# Patient Record
Sex: FEMALE | Race: WHITE | NOT HISPANIC OR LATINO | Employment: OTHER | URBAN - METROPOLITAN AREA
[De-identification: names, ages, dates, MRNs, and addresses within clinical notes are randomized per-mention and may not be internally consistent; named-entity substitution may affect disease eponyms.]

---

## 2017-01-30 ENCOUNTER — ALLSCRIPTS OFFICE VISIT (OUTPATIENT)
Dept: OTHER | Facility: OTHER | Age: 47
End: 2017-01-30

## 2017-03-02 ENCOUNTER — ALLSCRIPTS OFFICE VISIT (OUTPATIENT)
Dept: OTHER | Facility: OTHER | Age: 47
End: 2017-03-02

## 2018-01-14 VITALS
HEART RATE: 100 BPM | RESPIRATION RATE: 16 BRPM | DIASTOLIC BLOOD PRESSURE: 72 MMHG | HEIGHT: 68 IN | TEMPERATURE: 99.4 F | SYSTOLIC BLOOD PRESSURE: 124 MMHG | WEIGHT: 159.56 LBS | BODY MASS INDEX: 24.18 KG/M2

## 2018-01-15 VITALS
RESPIRATION RATE: 20 BRPM | BODY MASS INDEX: 23.98 KG/M2 | SYSTOLIC BLOOD PRESSURE: 122 MMHG | HEART RATE: 80 BPM | DIASTOLIC BLOOD PRESSURE: 70 MMHG | HEIGHT: 68 IN | WEIGHT: 158.25 LBS

## 2019-02-18 ENCOUNTER — OFFICE VISIT (OUTPATIENT)
Dept: FAMILY MEDICINE CLINIC | Facility: CLINIC | Age: 49
End: 2019-02-18

## 2019-02-18 VITALS
HEIGHT: 68 IN | RESPIRATION RATE: 12 BRPM | BODY MASS INDEX: 24.4 KG/M2 | WEIGHT: 161 LBS | DIASTOLIC BLOOD PRESSURE: 78 MMHG | HEART RATE: 80 BPM | SYSTOLIC BLOOD PRESSURE: 116 MMHG

## 2019-02-18 DIAGNOSIS — Z23 NEED FOR TUBERCULOSIS VACCINATION: ICD-10-CM

## 2019-02-18 DIAGNOSIS — Z00.00 PE (PHYSICAL EXAM), ANNUAL: Primary | ICD-10-CM

## 2019-02-18 DIAGNOSIS — Z12.11 SCREENING FOR MALIGNANT NEOPLASM OF COLON: ICD-10-CM

## 2019-02-18 DIAGNOSIS — Z23 NEED FOR TETANUS, DIPHTHERIA, AND ACELLULAR PERTUSSIS (TDAP) VACCINE: ICD-10-CM

## 2019-02-18 DIAGNOSIS — Z12.39 SCREENING FOR MALIGNANT NEOPLASM OF BREAST: ICD-10-CM

## 2019-02-18 PROCEDURE — 99396 PREV VISIT EST AGE 40-64: CPT | Performed by: NURSE PRACTITIONER

## 2019-02-18 PROCEDURE — 86580 TB INTRADERMAL TEST: CPT

## 2019-02-18 PROCEDURE — 90715 TDAP VACCINE 7 YRS/> IM: CPT

## 2019-02-18 PROCEDURE — 90471 IMMUNIZATION ADMIN: CPT

## 2019-02-18 NOTE — PROGRESS NOTES
Subjective     Regina Mchugh is a 53 y o   female and is here for routine health maintenance  The patient reports no problems  Cancer Screening  Colononoscopy not due   Mammogram ordered for pt  Pap will schedule          Immunization History   Administered Date(s) Administered    Influenza Quadrivalent, 6-35 Months IM 01/30/2017         Dentist Visit  within 6-12 months      The following portions of the patient's history were reviewed and updated as appropriate: allergies, current medications, past family history, past medical history, past social history, past surgical history and problem list       Review of Systems  Review of Systems   Constitutional: Negative  HENT: Negative  Eyes: Negative  Respiratory: Negative  Cardiovascular: Negative  Gastrointestinal: Negative  Endocrine: Negative  Genitourinary: Negative  Musculoskeletal: Negative  Skin: Negative  Allergic/Immunologic: Negative  Neurological: Negative  Hematological: Negative  Psychiatric/Behavioral: Negative  Objective   Vitals:    02/18/19 1342   BP: 116/78   Pulse: 80   Resp: 12     Wt Readings from Last 3 Encounters:   02/18/19 73 kg (161 lb)   03/02/17 72 4 kg (159 lb 8 9 oz)   01/30/17 71 8 kg (158 lb 4 oz)     BP Readings from Last 3 Encounters:   02/18/19 116/78   03/02/17 124/72   01/30/17 122/70     Pulse Readings from Last 3 Encounters:   02/18/19 80   03/02/17 100   01/30/17 80     BMI Readings from Last 3 Encounters:   02/18/19 24 66 kg/m²   03/02/17 24 62 kg/m²   01/30/17 24 42 kg/m²     Physical Exam   Constitutional: She is oriented to person, place, and time  She appears well-developed and well-nourished  HENT:   Head: Normocephalic  Eyes: Pupils are equal, round, and reactive to light  Neck: Normal range of motion  Neck supple  Cardiovascular: Normal rate and regular rhythm  Pulmonary/Chest: Effort normal and breath sounds normal    Abdominal: Soft   Bowel sounds are normal    Musculoskeletal: Normal range of motion  Neurological: She is alert and oriented to person, place, and time  Skin: Skin is warm  Psychiatric: She has a normal mood and affect  Her behavior is normal  Judgment and thought content normal        Assessment/Plan     Healthy female exam     1  PE: this was conducted for you and forms were completed  2  Patient Counseling:  --Nutrition: Stressed importance of moderation in sodium/caffeine intake, saturated fat and cholesterol, caloric balance, sufficient intake of fresh fruits, vegetables, fiber, calcium, iron  --Exercise: Stressed the importance of regular exercise  --Injury prevention: Discussed safety belts, safety helmets, smoke detector, smoking near bedding or upholstery  --Dental health: Discussed importance of regular tooth brushing, flossing, and dental visits  --Immunizations reviewed  --Discussed benefits of screening colonoscopy  --After hours service discussed with patient    3  Cancer Screening script given for mammo  TDap today and PPD applied  Will rto in 48-72 hours for this to be read and forms will be returned

## 2019-02-21 ENCOUNTER — CLINICAL SUPPORT (OUTPATIENT)
Dept: FAMILY MEDICINE CLINIC | Facility: CLINIC | Age: 49
End: 2019-02-21

## 2019-02-21 DIAGNOSIS — Z11.1 ENCOUNTER FOR PPD SKIN TEST READING: Primary | ICD-10-CM

## 2019-02-21 LAB
INDURATION: 0 MM
TB SKIN TEST: NEGATIVE

## 2021-05-02 ENCOUNTER — OFFICE VISIT (OUTPATIENT)
Dept: URGENT CARE | Facility: CLINIC | Age: 51
End: 2021-05-02

## 2021-05-02 ENCOUNTER — NURSE TRIAGE (OUTPATIENT)
Dept: OTHER | Facility: OTHER | Age: 51
End: 2021-05-02

## 2021-05-02 VITALS
HEIGHT: 67 IN | WEIGHT: 158.4 LBS | OXYGEN SATURATION: 95 % | BODY MASS INDEX: 24.86 KG/M2 | SYSTOLIC BLOOD PRESSURE: 143 MMHG | DIASTOLIC BLOOD PRESSURE: 68 MMHG | RESPIRATION RATE: 16 BRPM | TEMPERATURE: 97.8 F | HEART RATE: 102 BPM

## 2021-05-02 DIAGNOSIS — R22.0 RIGHT FACIAL SWELLING: ICD-10-CM
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
## 2021-05-02 PROCEDURE — G0382 LEV 3 HOSP TYPE B ED VISIT: HCPCS | Performed by: PHYSICIAN ASSISTANT

## 2021-05-02 RX ORDER — PREDNISONE 20 MG/1
40 TABLET ORAL DAILY
Qty: 6 TABLET | Refills: 0 | Status: SHIPPED | OUTPATIENT
Start: 2021-05-02 | End: 2021-05-05

## 2021-05-02 RX ORDER — DOXYCYCLINE HYCLATE 100 MG/1
100 CAPSULE ORAL EVERY 12 HOURS SCHEDULED
Qty: 14 CAPSULE | Refills: 0 | Status: SHIPPED | OUTPATIENT
Start: 2021-05-02 | End: 2021-05-09

## 2021-05-02 NOTE — TELEPHONE ENCOUNTER
Regarding: Tic Bite  ----- Message from Peder Shock sent at 5/2/2021 12:05 PM EDT -----  " I've had a tic bite for 3 days located behind my ear lobe and it is now swelling up   The swelling has spread down to my jaw line and neck "

## 2021-05-02 NOTE — TELEPHONE ENCOUNTER
Reason for Disposition   [1] Fever AND [2] area is very tender to touch    Answer Assessment - Initial Assessment Questions  1  TYPE of TICK: "Is it a wood tick or a deer tick?" If unsure, ask: "What size was the tick?" "Did it look more like a watermelon seed or a poppy seed?"       States "it is not a deer tick, its a common tick, large in size"     2  LOCATION: "Where is the tick bite located?"       Behind right earlobe     3  ONSET: "How long do you think the tick was attached before you removed it?" (Hours or days)       4/29/2021    4  TETANUS: "When was the last tetanus booster?"         5  PREGNANCY: "Is there any chance you are pregnant?" "When was your last menstrual period?"      *No Answer*    Yesterday my earlobe was swollen, warm to touch, and the lymph node felt swollen on that side  Woke this morning, with swelling of my right ear, jaw line and neck      Protocols used: TICK BITE-ADULT-

## 2021-05-02 NOTE — PATIENT INSTRUCTIONS
Tick Bite   AMBULATORY CARE:   What you need to know about a tick bite:  Most tick bites are not dangerous, but ticks can pass disease or infection when they bite  Ticks need to be removed quickly  You may have redness, pain, itching, and swelling near the bite  Blisters may also develop  Seek care immediately if:   · You have trouble walking or moving your legs  · You have joint pain, muscle pain, or muscle weakness within 1 month of a tick bite  · You have a fever, chills, headache, or rash  Contact your healthcare provider if:   · You cannot remove the tick  · The tick's head is stuck in your skin  · You have questions or concerns about your condition or care  Treatment for a tick bite:   · Apply ice  on your bite for 15 to 20 minutes every hour or as directed  Use an ice pack, or put crushed ice in a plastic bag  Cover it with a towel before you apply it to your skin  Ice helps prevent tissue damage and decreases swelling and pain  · Medicines  help decrease pain, redness, itching, and swelling  You may also need medicine to prevent or fight a bacterial infection  These medicines may be given as a cream, lotion, or pill  How to remove a tick:  Remove the tick as soon as possible to help prevent disease or infection  You are less likely to get sick from a tick bite if you remove the tick within 24 hours  Do not use petroleum jelly, nail polish, rubbing alcohol, or heat  These do not work and may be dangerous  Do the following to remove a tick:  · First, try a soapy cotton ball  Soak a cotton ball in liquid soap  Cover the tick with the cotton ball for 30 seconds  The tick may come off with the cotton ball when you pull it away  · Use tweezers if the soapy cotton ball does not work  Grasp the tick as close to your skin as possible  Pull the tick straight up and out  Do not touch the tick with your bare hands      · Do not twist or jerk the tick suddenly, because this may break off the tick's head or mouth parts  Do not leave any part of the tick in your skin  · Do not crush or squeeze the tick since its body may be infected with germs  Flush the tick down the toilet  · After the tick is removed, clean the area of the bite with rubbing alcohol  Then wash your hands with soap and water  Prevent a tick bite:  Ticks live in areas covered by brush and grass  They may even be found in your lawn if you live in certain areas  Outdoor pets can carry ticks inside the house  Ticks can grab onto you or your clothes when you walk by grass or brush  If you go into areas that contain many trees, tall grasses, and underbrush, do the following:  · Wear light colored pants and a long-sleeved shirt  Tuck your pants into your socks or boots  Tuck in your shirt  Wear sleeves that fit close to the skin at your wrists and neck  This will help prevent ticks from crawling through gaps in your clothing and onto your skin  Wear a hat in areas with trees  · Apply insect repellant on your skin  The insect repellant should contain DEET  Do not put insect repellant on skin that is cut, scratched, or irritated  Always use soap and water to wash the insect repellant off as soon as possible once you are indoors  Do not apply insect repellant on your child's face or hands  · Spray insect repellant onto your clothes  Use permethrin spray  This spray kills ticks that crawl on your clothing  Be sure to spray the tops of your boots, bottom of pant legs, and sleeve cuffs  As soon as possible, wash and dry clothing in hot water and high heat  · Check your clothing, hair, and skin for ticks  Shower within 2 hours of coming indoors  Carefully check the hairline, armpits, neck, and waist  Check your pets and children for ticks  Remove ticks from pets the same way as you remove them from people  · Decrease the risk for ticks in your yard  Ticks like to live in shady, moist areas   Rhett Lade your lawn regularly to keep the grass short  Trim the grass around birdbaths and fences  Cut branches that are overgrown and take them out of the yard  Clear out leaf piles  Gayowen Olruth ann firewood in a dry, naomie area  Follow up with your healthcare provider as directed:  Write down your questions so you remember to ask them during your visits  © Copyright [address] Information is for End User's use only and may not be sold, redistributed or otherwise used for commercial purposes  All illustrations and images included in CareNotes® are the copyrighted property of A D A M , Inc  or [facility] [place] [employer]   The above information is an  only  It is not intended as medical advice for individual conditions or treatments  Talk to your doctor, nurse or pharmacist before following any medical regimen to see if it is safe and effective for you

## 2021-05-05 ENCOUNTER — HOSPITAL ENCOUNTER (EMERGENCY)
Facility: HOSPITAL | Age: 51
Discharge: HOME | End: 2021-05-05
Attending: EMERGENCY MEDICINE

## 2021-05-05 ENCOUNTER — OFFICE VISIT (OUTPATIENT)
Dept: FAMILY MEDICINE CLINIC | Facility: CLINIC | Age: 51
End: 2021-05-05

## 2021-05-05 VITALS
TEMPERATURE: 98.5 F | HEIGHT: 67 IN | BODY MASS INDEX: 25.15 KG/M2 | DIASTOLIC BLOOD PRESSURE: 82 MMHG | HEART RATE: 102 BPM | SYSTOLIC BLOOD PRESSURE: 128 MMHG | WEIGHT: 160.2 LBS | RESPIRATION RATE: 16 BRPM

## 2021-05-05 VITALS
RESPIRATION RATE: 18 BRPM | OXYGEN SATURATION: 96 % | HEIGHT: 67 IN | WEIGHT: 160 LBS | BODY MASS INDEX: 25.11 KG/M2 | HEART RATE: 105 BPM | DIASTOLIC BLOOD PRESSURE: 80 MMHG | TEMPERATURE: 98.2 F | SYSTOLIC BLOOD PRESSURE: 143 MMHG
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
## 2021-05-05 DIAGNOSIS — L03.90 CELLULITIS: Primary | ICD-10-CM

## 2021-05-05 DIAGNOSIS — H60.11 CELLULITIS OF RIGHT EARLOBE: Primary | ICD-10-CM

## 2021-05-05 PROCEDURE — 99212 OFFICE O/P EST SF 10 MIN: CPT | Performed by: FAMILY MEDICINE

## 2021-05-05 PROCEDURE — 99282 EMERGENCY DEPT VISIT SF MDM: CPT

## 2021-05-05 PROCEDURE — 99284 EMERGENCY DEPT VISIT MOD MDM: CPT | Performed by: EMERGENCY MEDICINE

## 2021-05-05 RX ORDER — CEPHALEXIN 500 MG/1
500 CAPSULE ORAL EVERY 6 HOURS SCHEDULED
Qty: 28 CAPSULE | Refills: 0 | Status: SHIPPED | OUTPATIENT
Start: 2021-05-05 | End: 2021-05-12

## 2021-05-05 RX ORDER — DOXYCYCLINE HYCLATE 100 MG/1
100 CAPSULE ORAL 2 TIMES DAILY
Qty: 14 CAPSULE | Refills: 0 | Status: SHIPPED | OUTPATIENT
Start: 2021-05-05 | End: 2021-05-12

## 2021-05-05 NOTE — PROGRESS NOTES
Assessment/Plan:     Diagnosis ICD-10-CM Associated Orders   1  Cellulitis of right earlobe  H60 11 Ambulatory Referral to Otolaryngology     Transfer to other facility   2  Tick bite, subsequent encounter  W57  XXXD Lyme Total Antibody Profile with reflex to WB     Disposition:  - patient was scheduled with ENT Dr Villa Chang tomorrow at 12 pm  Discussed ER evaluation, patient understands and in agreement and would like to go to [facility]  Subjective:   Chief Complaint   Patient presents with    Tick Removal     on ear times 6 days ago  Pt states earlobe is swollen and warm      Patient ID: Jerzy Harman is a 53 y o  female who found a tick behind her right earlobe on Thursday, she removed it without difficulty  Then on Saturday she noted increased redness and swelling of her earlobe extending down her jaw /neck with swollen right sided neck glands  She went to urgent care on Sunday and was started on Prednisone 40 mg daily for 3 days which she completed last night and Doxycycline 100 mg bid  Patient states she is now having increased pain, redness and swelling behind her ear, no fever or chills         The following portions of the patient's history were reviewed and updated as appropriate: allergies, current medications, past family history, past medical history, past social history, past surgical history and problem list     Past Medical History:   Diagnosis Date    Torn meniscus     UTI (urinary tract infection)      Past Surgical History:   Procedure Laterality Date    ARTHROSCOPY KNEE Right     3 separate surgeries      Family History   Problem Relation Age of Onset    Other Mother         pace maker    Other Father     Heart disease Father     Alcohol abuse Neg Hx     Substance Abuse Neg Hx     Mental illness Neg Hx      Social History     Socioeconomic History    Marital status: /Civil Union     Spouse name: Not on file    Number of children: Not on file    Years of education: Not on file    Highest education level: Not on file   Occupational History    Not on file   Social Needs    Financial resource strain: Not on file    Food insecurity     Worry: Not on file     Inability: Not on file    Transportation needs     Medical: Not on file     Non-medical: Not on file   Tobacco Use    Smoking status: Current Every Day Smoker     Packs/day: 1 00    Smokeless tobacco: Never Used   Substance and Sexual Activity    Alcohol use: Yes     Comment: Social     Drug use: Not on file    Sexual activity: Not on file   Lifestyle    Physical activity     Days per week: Not on file     Minutes per session: Not on file    Stress: Not on file   Relationships    Social connections     Talks on phone: Not on file     Gets together: Not on file     Attends Gnosticism service: Not on file     Active member of club or organization: Not on file     Attends meetings of clubs or organizations: Not on file     Relationship status: Not on file    Intimate partner violence     Fear of current or ex partner: Not on file     Emotionally abused: Not on file     Physically abused: Not on file     Forced sexual activity: Not on file   Other Topics Concern    Not on file   Social History Narrative    Always uses seat belt    Caffeine use: 3-4 cups coffee daily    Does not use illicit drugs    Has smoke detectors       Current Outpatient Medications:     doxycycline hyclate (VIBRAMYCIN) 100 mg capsule, Take 1 capsule (100 mg total) by mouth every 12 (twelve) hours for 7 days, Disp: 14 capsule, Rfl: 0    predniSONE 20 mg tablet, Take 2 tablets (40 mg total) by mouth daily for 3 days, Disp: 6 tablet, Rfl: 0    Review of Systems   Constitutional: Negative for chills, diaphoresis, fatigue and fever  HENT: Positive for ear pain and facial swelling  Negative for congestion and sore throat  Respiratory: Negative for cough, shortness of breath and wheezing  Cardiovascular: Negative for chest pain and palpitations  Gastrointestinal: Negative for abdominal pain, diarrhea, nausea and vomiting  Musculoskeletal: Positive for neck pain  Skin:        As per HPI   Neurological: Negative for dizziness and headaches  Objective:    Vitals:    05/05/21 1443   BP: 128/82   Pulse: 102   Resp: 16   Temp: 98 5 °F (36 9 °C)   Weight: 72 7 kg (160 lb 3 2 oz)   Height: 5' 7" (1 702 m)        Physical Exam  Constitutional:       General: She is not in acute distress  Appearance: She is not ill-appearing  HENT:      Right Ear: Tympanic membrane normal       Left Ear: Tympanic membrane, ear canal and external ear normal       Ears:      Comments: Right earlobe with erythema, edema and tenderness extending behind and below ear  Cardiovascular:      Rate and Rhythm: Normal rate and regular rhythm  Heart sounds: Normal heart sounds  Pulmonary:      Effort: Pulmonary effort is normal       Breath sounds: Normal breath sounds  Lymphadenopathy:      Head:      Right side of head: Submandibular and tonsillar adenopathy present  Neurological:      Mental Status: She is alert and oriented to person, place, and time     Psychiatric:         Mood and Affect: Mood normal          Behavior: Behavior normal

## 2021-05-06 NOTE — ED PROVIDER NOTES
History  Chief Complaint   Patient presents with    Wound Check     Patient states that she found a tick on her right ear lobe on Thursday and has been on antibiotics since Sunday and swelling has increased today     53year old female presents for evaluation of redness and swelling to right ear lobe  Patient was evaluated at PCPs office and sent to the ED for possible IV antibiotics  Patient had a tick bite on her right ear lobe last week  Was started on prednisone and 1 week of doxycycline which she has been compliant  States the redness has gotten worse which prompted her visit to PCP today  Denies fever, vomiting or systemic signs of illness  Wound Check         Prior to Admission Medications   Prescriptions Last Dose Informant Patient Reported? Taking?   doxycycline hyclate (VIBRAMYCIN) 100 mg capsule   No No   Sig: Take 1 capsule (100 mg total) by mouth every 12 (twelve) hours for 7 days   predniSONE 20 mg tablet   No No   Sig: Take 2 tablets (40 mg total) by mouth daily for 3 days      Facility-Administered Medications: None       Past Medical History:   Diagnosis Date    Torn meniscus     UTI (urinary tract infection)        Past Surgical History:   Procedure Laterality Date    ARTHROSCOPY KNEE Right     3 separate surgeries        Family History   Problem Relation Age of Onset    Other Mother         pace maker    Other Father     Heart disease Father     Alcohol abuse Neg Hx     Substance Abuse Neg Hx     Mental illness Neg Hx      I have reviewed and agree with the history as documented  E-Cigarette/Vaping    E-Cigarette Use Never User      E-Cigarette/Vaping Substances     Social History     Tobacco Use    Smoking status: Current Every Day Smoker     Packs/day: 1 00    Smokeless tobacco: Never Used   Substance Use Topics    Alcohol use: Yes     Comment: Social     Drug use: Not on file       Review of Systems   Constitutional: Negative for fatigue and fever     Respiratory: Negative for shortness of breath  Skin: Positive for color change  All other systems reviewed and are negative  Physical Exam  Physical Exam  Vitals signs and nursing note reviewed  Constitutional:       Appearance: She is well-developed  HENT:      Head: Normocephalic and atraumatic  Right Ear: Ear canal normal       Left Ear: Ear canal normal       Ears:      Comments: Right ear lobe with erythema and edema  No fluctuance or signs of pus  No mastoid erythema or tenderness  Normal canal  Normal tragus  Nose: Nose normal    Eyes:      General: No scleral icterus  Neck:      Musculoskeletal: Normal range of motion  Cardiovascular:      Rate and Rhythm: Normal rate  Pulmonary:      Effort: Pulmonary effort is normal  No respiratory distress  Abdominal:      General: There is no distension  Musculoskeletal: Normal range of motion  General: No deformity  Skin:     Findings: No rash  Neurological:      General: No focal deficit present  Mental Status: She is alert and oriented to person, place, and time     Psychiatric:         Mood and Affect: Mood normal          Vital Signs  ED Triage Vitals   Temperature Pulse Respirations Blood Pressure SpO2   05/05/21 1703 05/05/21 1704 05/05/21 1704 05/05/21 1704 05/05/21 1704   98 2 °F (36 8 °C) 105 18 143/80 96 %      Temp Source Heart Rate Source Patient Position - Orthostatic VS BP Location FiO2 (%)   05/05/21 1703 -- 05/05/21 1704 05/05/21 1704 --   Temporal  Lying Right arm       Pain Score       --                  Vitals:    05/05/21 1704   BP: 143/80   Pulse: 105   Patient Position - Orthostatic VS: Lying         Visual Acuity      ED Medications  Medications - No data to display    Diagnostic Studies  Results Reviewed     None                 No orders to display              Procedures  Procedures         ED Course                             SBIRT 20yo+      Most Recent Value   SBIRT (22 yo +)   In order to provide better care to our patients, we are screening all of our patients for alcohol and drug use  Would it be okay to ask you these screening questions? Yes Filed at: 05/05/2021 1723   Initial Alcohol Screen: US AUDIT-C    1  How often do you have a drink containing alcohol?  0 Filed at: 05/05/2021 1723   2  How many drinks containing alcohol do you have on a typical day you are drinking? 0 Filed at: 05/05/2021 1723   3a  Male UNDER 65: How often do you have five or more drinks on one occasion? 0 Filed at: 05/05/2021 1723   3b  FEMALE Any Age, or MALE 65+: How often do you have 4 or more drinks on one occassion? 0 Filed at: 05/05/2021 1723   Audit-C Score  0 Filed at: 05/05/2021 1723   CARMEN: How many times in the past year have you    Used an illegal drug or used a prescription medication for non-medical reasons? Never Filed at: 05/05/2021 1723                    MDM  Number of Diagnoses or Management Options  Cellulitis:   Diagnosis management comments: 53 yof with cellulitis of right ear lobe  Uncertain if caused by tick bite itself versus open wound becoming infected with unrelated source of more common causes of cellulitis including strep  Discussed dosing of doxy for lyme prophylaxis vs full course of treatment being 14 days, not 7  Will double cover with keflex  Patient already aware of skin sensitivity with doxy  Has appt with ENT this week  Strict RTED discussed  No IV abx required at this time  Disposition  Final diagnoses:   Cellulitis     Time reflects when diagnosis was documented in both MDM as applicable and the Disposition within this note     Time User Action Codes Description Comment    5/5/2021  5:48 PM Hortencia Saul Add [T01 15] Cellulitis       ED Disposition     ED Disposition Condition Date/Time Comment    Discharge Stable Wed May 5, 2021  5:48 PM Corrine Armenta discharge to home/self care              Follow-up Information     Follow up With Specialties Details Why Contact Info Additional Information    Jaqueline Hines, [address] [place], Nurse Practitioner In 1 week For wound re-check [address] [place] [address] [address] [place] [address] Emergency Department Emergency Medicine  If symptoms worsen [address] [place] [id number]  [address] [facility] Emergency Department, [address] [place], Babak combs, Luige Contreras [address]          Discharge Medication List as of 5/5/2021  5:51 PM      START taking these medications    Details   cephalexin (KEFLEX) 500 mg capsule Take 1 capsule (500 mg total) by mouth every 6 (six) hours for 7 days, Starting Wed 5/5/2021, Until Wed 5/12/2021, Print      !! doxycycline hyclate (VIBRAMYCIN) 100 mg capsule Take 1 capsule (100 mg total) by mouth 2 (two) times a day for 7 days, Starting Wed 5/5/2021, Until Wed 5/12/2021, Print       !! - Potential duplicate medications found  Please discuss with provider  CONTINUE these medications which have NOT CHANGED    Details   !! doxycycline hyclate (VIBRAMYCIN) 100 mg capsule Take 1 capsule (100 mg total) by mouth every 12 (twelve) hours for 7 days, Starting Sun 5/2/2021, Until Sun 5/9/2021, Normal      predniSONE 20 mg tablet Take 2 tablets (40 mg total) by mouth daily for 3 days, Starting Sun 5/2/2021, Until Wed 5/5/2021, Normal       !! - Potential duplicate medications found  Please discuss with provider  No discharge procedures on file      PDMP Review     None          ED Provider  Electronically Signed by           Jayda Sexton DO  05/05/21 4875

## 2021-05-10 NOTE — PROGRESS NOTES
[id number]        NAME: Hay Mcfadden is a 53 y o  female  : 1970    MRN: [id number]  DATE: May 10, 2021  TIME: 3:12 AM    Assessment and Plan   Tick bite, initial encounter [W57  XXXA]  1  Tick bite, initial encounter  doxycycline hyclate (VIBRAMYCIN) 100 mg capsule   2  Right facial swelling  predniSONE 20 mg tablet         Patient Instructions       Follow up with PCP in 3-5 days  Proceed to  ER if symptoms worsen  Chief Complaint     Chief Complaint   Patient presents with    Insect Bite     Pt found dog tick on R earlobe, removed tick  evening swelling, pruritis @ R ear lobe, down the neck/gland, and R lower lip is itchy  History of Present Illness         60-year-old female presents the clinic with a tick bite to right earlobe that she removed on Thursday  Patient states that it was a dog tick and not a deer tick and states that Saturday in the she noticed swelling and itching to the right earlobe  Patient states that now the erythema and irritation radiates down the right-sided neck and she also feels that the right sided lower lip is itchy and swollen  Patient denies any difficulty swallowing, throat tightness, shortness of breath, difficulty talking or sore throat  Review of Systems   Review of Systems   Constitutional: Negative for chills, fatigue and fever  Skin: Positive for color change and rash  Negative for wound  Neurological: Negative for dizziness, weakness, light-headedness, numbness and headaches           Current Medications       Current Outpatient Medications:     cephalexin (KEFLEX) 500 mg capsule, Take 1 capsule (500 mg total) by mouth every 6 (six) hours for 7 days, Disp: 28 capsule, Rfl: 0    doxycycline hyclate (VIBRAMYCIN) 100 mg capsule, Take 1 capsule (100 mg total) by mouth 2 (two) times a day for 7 days, Disp: 14 capsule, Rfl: 0    Current Allergies     Allergies as of 2021 - Reviewed 2021   Allergen Reaction Noted    Dust mite extract Allergic Rhinitis 01/30/2017    Molds & smuts Allergic Rhinitis 01/30/2017    Other Cough 01/30/2017            The following portions of the patient's history were reviewed and updated as appropriate: allergies, current medications, past family history, past medical history, past social history, past surgical history and problem list      Past Medical History:   Diagnosis Date    Torn meniscus     UTI (urinary tract infection)        Past Surgical History:   Procedure Laterality Date    ARTHROSCOPY KNEE Right     3 separate surgeries        Family History   Problem Relation Age of Onset    Other Mother         pace maker    Other Father     Heart disease Father     Alcohol abuse Neg Hx     Substance Abuse Neg Hx     Mental illness Neg Hx          Medications have been verified  Objective   /68   Pulse 102   Temp 97 8 °F (36 6 °C)   Resp 16   Ht 5' 7" (1 702 m)   Wt 71 8 kg (158 lb 6 4 oz)   SpO2 95%   BMI 24 81 kg/m²   No LMP recorded  Physical Exam     Physical Exam  Vitals signs and nursing note reviewed  Constitutional:       General: She is not in acute distress  Appearance: She is well-developed  She is not diaphoretic  HENT:      Head: Normocephalic and atraumatic  Right Ear: Hearing, tympanic membrane and ear canal normal       Ears:      Comments:   Erythema noted to right-sided upper ear, preauricular lymph node noted with tenderness noted to inferior aspect of outer ear and pain with ear movement  Nose: Nose normal       Mouth/Throat:      Lips: Pink  Mouth: Mucous membranes are moist  No injury  Dentition: Normal dentition  Pharynx: Oropharynx is clear  Uvula midline  Comments:   Mild swelling noted to right-sided lower lip, no difficulty speaking, no drooling, full movement of mouth without difficulty    Pulmonary:      Effort: Pulmonary effort is normal    Musculoskeletal: Normal range of motion  Lymphadenopathy:      Head:      Right side of head: Submandibular and preauricular adenopathy present  Skin:     General: Skin is warm and dry  Capillary Refill: Capillary refill takes less than 2 seconds  Neurological:      Mental Status: She is alert and oriented to person, place, and time

## 2022-09-15 ENCOUNTER — APPOINTMENT (OUTPATIENT)
Dept: URGENT CARE | Facility: CLINIC | Age: 52
End: 2022-09-15

## 2022-09-15 ENCOUNTER — APPOINTMENT (OUTPATIENT)
Dept: LAB | Facility: CLINIC | Age: 52
End: 2022-09-15

## 2022-09-15 DIAGNOSIS — Z02.1 PRE-EMPLOYMENT HEALTH SCREENING EXAMINATION: Primary | ICD-10-CM

## 2022-09-15 PROCEDURE — 36415 COLL VENOUS BLD VENIPUNCTURE: CPT
